# Patient Record
(demographics unavailable — no encounter records)

---

## 2025-04-24 NOTE — HISTORY OF PRESENT ILLNESS
[N] : Patient denies prior pregnancies [FreeTextEntry1] : PATIENT PRESENT FOR ANNUAL WELLWOMEN EXAM. [TextBox_4] : Pt presents for annual /1st GYN visit  pt reports only being sexually active with females hx ADHD on medication recently  reports cramps and n/v with cycles for the past 4-5 months  [Mammogramdate] : 0 [BreastSonogramDate] : 0 [PapSmeardate] : 0 [BoneDensityDate] : 0 [ColonoscopyDate] : 0 [LMPDate] : 4/16/25

## 2025-04-24 NOTE — PROCEDURE
[Cervical Pap Smear] : cervical Pap smear [Liquid Base] : liquid base [GC & Chlamydia via Pap] : GC & Chlamydia via Pap [Tolerated Well] : the patient tolerated the procedure well [No Complications] : there were no complications [de-identified] : HPV

## 2025-04-24 NOTE — PLAN
[FreeTextEntry1] : Initial/annual with pap/cx/hpv  TVS  Multi with C/D and pt takes a probiotic  increase po fluids will f/u results and rto annually and prn

## 2025-04-24 NOTE — PHYSICAL EXAM
[MA] : MA [FreeTextEntry2] : Kacie [Appropriately responsive] : appropriately responsive [Alert] : alert [No Acute Distress] : no acute distress [Soft] : soft [Non-tender] : non-tender [Non-distended] : non-distended [Oriented x3] : oriented x3 [Examination Of The Breasts] : a normal appearance [No Masses] : no breast masses were palpable [Labia Majora] : normal [Labia Minora] : normal [Normal] : normal [Uterine Adnexae] : normal